# Patient Record
Sex: MALE | Race: WHITE | NOT HISPANIC OR LATINO | Employment: OTHER | URBAN - METROPOLITAN AREA
[De-identification: names, ages, dates, MRNs, and addresses within clinical notes are randomized per-mention and may not be internally consistent; named-entity substitution may affect disease eponyms.]

---

## 2023-10-10 ENCOUNTER — ANCILLARY PROCEDURE (OUTPATIENT)
Dept: RADIOLOGY | Facility: CLINIC | Age: 73
End: 2023-10-10
Payer: MEDICARE

## 2023-10-10 DIAGNOSIS — M25.571 PAIN IN RIGHT ANKLE AND JOINTS OF RIGHT FOOT: ICD-10-CM

## 2023-10-10 PROCEDURE — 73630 X-RAY EXAM OF FOOT: CPT | Mod: RIGHT SIDE | Performed by: RADIOLOGY

## 2023-10-10 PROCEDURE — 73630 X-RAY EXAM OF FOOT: CPT | Mod: RT,FY

## 2025-05-26 ENCOUNTER — HOSPITAL ENCOUNTER (OUTPATIENT)
Facility: HOSPITAL | Age: 75
Setting detail: OBSERVATION
Discharge: HOME | End: 2025-05-27
Attending: INTERNAL MEDICINE | Admitting: INTERNAL MEDICINE
Payer: MEDICARE

## 2025-05-26 DIAGNOSIS — R42 DIZZINESS: Primary | ICD-10-CM

## 2025-05-26 DIAGNOSIS — R42 VERTIGO: ICD-10-CM

## 2025-05-26 PROBLEM — G47.00 PERSISTENT INSOMNIA: Status: ACTIVE | Noted: 2021-01-20

## 2025-05-26 PROBLEM — E78.2 MIXED HYPERLIPIDEMIA: Status: ACTIVE | Noted: 2019-01-08

## 2025-05-26 PROBLEM — E78.00 HYPERCHOLESTEREMIA: Status: ACTIVE | Noted: 2025-05-26

## 2025-05-26 PROBLEM — Z86.718 HISTORY OF DEEP VENOUS THROMBOSIS: Status: ACTIVE | Noted: 2023-08-11

## 2025-05-26 PROBLEM — F13.20 SEDATIVE, HYPNOTIC OR ANXIOLYTIC DEPENDENCE, UNCOMPLICATED (MULTI): Status: ACTIVE | Noted: 2021-01-21

## 2025-05-26 PROBLEM — N40.0 BENIGN PROSTATIC HYPERPLASIA WITHOUT LOWER URINARY TRACT SYMPTOMS: Status: ACTIVE | Noted: 2023-08-11

## 2025-05-26 PROBLEM — D68.59 THROMBOPHILIA (MULTI): Status: ACTIVE | Noted: 2024-08-02

## 2025-05-26 PROBLEM — M50.30 DEGENERATION OF INTERVERTEBRAL DISC OF CERVICAL REGION: Status: ACTIVE | Noted: 2018-04-04

## 2025-05-26 PROBLEM — R73.03 PREDIABETES: Status: ACTIVE | Noted: 2021-01-21

## 2025-05-26 PROBLEM — M75.81 ROTATOR CUFF TENDONITIS, RIGHT: Status: ACTIVE | Noted: 2022-08-19

## 2025-05-26 PROBLEM — Z96.659 HISTORY OF TOTAL KNEE REPLACEMENT: Status: ACTIVE | Noted: 2023-07-26

## 2025-05-26 LAB
GLUCOSE BLD MANUAL STRIP-MCNC: 111 MG/DL (ref 74–99)
GLUCOSE BLD MANUAL STRIP-MCNC: 91 MG/DL (ref 74–99)

## 2025-05-26 PROCEDURE — 96372 THER/PROPH/DIAG INJ SC/IM: CPT

## 2025-05-26 PROCEDURE — 2500000002 HC RX 250 W HCPCS SELF ADMINISTERED DRUGS (ALT 637 FOR MEDICARE OP, ALT 636 FOR OP/ED)

## 2025-05-26 PROCEDURE — 2500000004 HC RX 250 GENERAL PHARMACY W/ HCPCS (ALT 636 FOR OP/ED): Mod: JZ

## 2025-05-26 PROCEDURE — 82947 ASSAY GLUCOSE BLOOD QUANT: CPT

## 2025-05-26 PROCEDURE — 2500000001 HC RX 250 WO HCPCS SELF ADMINISTERED DRUGS (ALT 637 FOR MEDICARE OP)

## 2025-05-26 PROCEDURE — 99223 1ST HOSP IP/OBS HIGH 75: CPT

## 2025-05-26 PROCEDURE — G0378 HOSPITAL OBSERVATION PER HR: HCPCS

## 2025-05-26 RX ORDER — TESTOSTERONE GEL, 1% 10 MG/G
20.25 GEL TRANSDERMAL DAILY
COMMUNITY

## 2025-05-26 RX ORDER — ATORVASTATIN CALCIUM 10 MG/1
5 TABLET, FILM COATED ORAL NIGHTLY
COMMUNITY

## 2025-05-26 RX ORDER — OMEGA-3-ACID ETHYL ESTERS 1 G/1
1 CAPSULE, LIQUID FILLED ORAL 2 TIMES DAILY
COMMUNITY

## 2025-05-26 RX ORDER — ONDANSETRON HYDROCHLORIDE 2 MG/ML
4 INJECTION, SOLUTION INTRAVENOUS EVERY 6 HOURS PRN
Status: DISCONTINUED | OUTPATIENT
Start: 2025-05-26 | End: 2025-05-27 | Stop reason: HOSPADM

## 2025-05-26 RX ORDER — ENOXAPARIN SODIUM 100 MG/ML
40 INJECTION SUBCUTANEOUS EVERY 24 HOURS
Status: DISCONTINUED | OUTPATIENT
Start: 2025-05-26 | End: 2025-05-27 | Stop reason: HOSPADM

## 2025-05-26 RX ORDER — TAMSULOSIN HYDROCHLORIDE 0.4 MG/1
0.4 CAPSULE ORAL DAILY
Status: DISCONTINUED | OUTPATIENT
Start: 2025-05-26 | End: 2025-05-27 | Stop reason: HOSPADM

## 2025-05-26 RX ORDER — CALCIUM CARBONATE/VITAMIN D3 250-3.125
1 TABLET ORAL
COMMUNITY

## 2025-05-26 RX ORDER — OMEPRAZOLE 40 MG/1
40 CAPSULE, DELAYED RELEASE ORAL
Status: ON HOLD | COMMUNITY
End: 2025-05-27 | Stop reason: ENTERED-IN-ERROR

## 2025-05-26 RX ORDER — IBUPROFEN 100 MG/5ML
1000 SUSPENSION, ORAL (FINAL DOSE FORM) ORAL DAILY
COMMUNITY

## 2025-05-26 RX ORDER — PANTOPRAZOLE SODIUM 40 MG/10ML
40 INJECTION, POWDER, LYOPHILIZED, FOR SOLUTION INTRAVENOUS
Status: DISCONTINUED | OUTPATIENT
Start: 2025-05-27 | End: 2025-05-27 | Stop reason: HOSPADM

## 2025-05-26 RX ORDER — BISMUTH SUBSALICYLATE 262 MG
1 TABLET,CHEWABLE ORAL DAILY
COMMUNITY

## 2025-05-26 RX ORDER — ZOLPIDEM TARTRATE 5 MG/1
5 TABLET ORAL NIGHTLY
Status: DISCONTINUED | OUTPATIENT
Start: 2025-05-26 | End: 2025-05-27 | Stop reason: HOSPADM

## 2025-05-26 RX ORDER — METFORMIN HYDROCHLORIDE 1000 MG/1
1000 TABLET ORAL
COMMUNITY

## 2025-05-26 RX ORDER — VALACYCLOVIR HYDROCHLORIDE 1 G/1
1000 TABLET, FILM COATED ORAL 2 TIMES DAILY PRN
COMMUNITY

## 2025-05-26 RX ORDER — ACETAMINOPHEN 325 MG/1
650 TABLET ORAL EVERY 4 HOURS PRN
Status: DISCONTINUED | OUTPATIENT
Start: 2025-05-26 | End: 2025-05-27 | Stop reason: HOSPADM

## 2025-05-26 RX ORDER — GUAIFENESIN 600 MG/1
600 TABLET, EXTENDED RELEASE ORAL EVERY 12 HOURS PRN
Status: DISCONTINUED | OUTPATIENT
Start: 2025-05-26 | End: 2025-05-27 | Stop reason: HOSPADM

## 2025-05-26 RX ORDER — ICOSAPENT ETHYL 1 G/1
1 CAPSULE ORAL
Status: DISCONTINUED | OUTPATIENT
Start: 2025-05-26 | End: 2025-05-27 | Stop reason: HOSPADM

## 2025-05-26 RX ORDER — TAMSULOSIN HYDROCHLORIDE 0.4 MG/1
0.4 CAPSULE ORAL NIGHTLY
COMMUNITY
End: 2025-05-27 | Stop reason: HOSPADM

## 2025-05-26 RX ORDER — SUMATRIPTAN SUCCINATE 100 MG/1
100 TABLET ORAL ONCE AS NEEDED
COMMUNITY

## 2025-05-26 RX ORDER — PANTOPRAZOLE SODIUM 40 MG/1
40 TABLET, DELAYED RELEASE ORAL
Status: DISCONTINUED | OUTPATIENT
Start: 2025-05-27 | End: 2025-05-27 | Stop reason: HOSPADM

## 2025-05-26 RX ORDER — ATORVASTATIN CALCIUM 10 MG/1
5 TABLET, FILM COATED ORAL NIGHTLY
Status: DISCONTINUED | OUTPATIENT
Start: 2025-05-26 | End: 2025-05-27 | Stop reason: HOSPADM

## 2025-05-26 RX ORDER — TALC
3 POWDER (GRAM) TOPICAL NIGHTLY PRN
Status: DISCONTINUED | OUTPATIENT
Start: 2025-05-26 | End: 2025-05-27 | Stop reason: HOSPADM

## 2025-05-26 RX ORDER — MELOXICAM 15 MG/1
15 TABLET ORAL DAILY
COMMUNITY

## 2025-05-26 RX ORDER — POLYETHYLENE GLYCOL 3350 17 G/17G
17 POWDER, FOR SOLUTION ORAL DAILY PRN
Status: DISCONTINUED | OUTPATIENT
Start: 2025-05-26 | End: 2025-05-27 | Stop reason: HOSPADM

## 2025-05-26 RX ORDER — INSULIN LISPRO 100 [IU]/ML
0-10 INJECTION, SOLUTION INTRAVENOUS; SUBCUTANEOUS
Status: DISCONTINUED | OUTPATIENT
Start: 2025-05-26 | End: 2025-05-27 | Stop reason: HOSPADM

## 2025-05-26 RX ORDER — AMOXICILLIN 500 MG/1
500 CAPSULE ORAL
Status: ON HOLD | COMMUNITY
End: 2025-05-26 | Stop reason: WASHOUT

## 2025-05-26 RX ORDER — ZOLPIDEM TARTRATE 6.25 MG/1
12.5 TABLET, FILM COATED, EXTENDED RELEASE ORAL NIGHTLY PRN
COMMUNITY

## 2025-05-26 RX ADMIN — ENOXAPARIN SODIUM 40 MG: 40 INJECTION SUBCUTANEOUS at 17:40

## 2025-05-26 RX ADMIN — ATORVASTATIN CALCIUM 5 MG: 10 TABLET, FILM COATED ORAL at 21:36

## 2025-05-26 RX ADMIN — ZOLPIDEM TARTRATE 5 MG: 5 TABLET, FILM COATED ORAL at 21:10

## 2025-05-26 SDOH — ECONOMIC STABILITY: INCOME INSECURITY: IN THE PAST 12 MONTHS HAS THE ELECTRIC, GAS, OIL, OR WATER COMPANY THREATENED TO SHUT OFF SERVICES IN YOUR HOME?: NO

## 2025-05-26 SDOH — ECONOMIC STABILITY: HOUSING INSECURITY: IN THE LAST 12 MONTHS, WAS THERE A TIME WHEN YOU WERE NOT ABLE TO PAY THE MORTGAGE OR RENT ON TIME?: NO

## 2025-05-26 SDOH — ECONOMIC STABILITY: FOOD INSECURITY: WITHIN THE PAST 12 MONTHS, YOU WORRIED THAT YOUR FOOD WOULD RUN OUT BEFORE YOU GOT THE MONEY TO BUY MORE.: NEVER TRUE

## 2025-05-26 SDOH — SOCIAL STABILITY: SOCIAL INSECURITY: ARE THERE ANY APPARENT SIGNS OF INJURIES/BEHAVIORS THAT COULD BE RELATED TO ABUSE/NEGLECT?: NO

## 2025-05-26 SDOH — SOCIAL STABILITY: SOCIAL INSECURITY: DO YOU FEEL ANYONE HAS EXPLOITED OR TAKEN ADVANTAGE OF YOU FINANCIALLY OR OF YOUR PERSONAL PROPERTY?: NO

## 2025-05-26 SDOH — ECONOMIC STABILITY: HOUSING INSECURITY: AT ANY TIME IN THE PAST 12 MONTHS, WERE YOU HOMELESS OR LIVING IN A SHELTER (INCLUDING NOW)?: NO

## 2025-05-26 SDOH — SOCIAL STABILITY: SOCIAL INSECURITY
WITHIN THE LAST YEAR, HAVE YOU BEEN RAPED OR FORCED TO HAVE ANY KIND OF SEXUAL ACTIVITY BY YOUR PARTNER OR EX-PARTNER?: NO

## 2025-05-26 SDOH — ECONOMIC STABILITY: FOOD INSECURITY: WITHIN THE PAST 12 MONTHS, THE FOOD YOU BOUGHT JUST DIDN'T LAST AND YOU DIDN'T HAVE MONEY TO GET MORE.: NEVER TRUE

## 2025-05-26 SDOH — SOCIAL STABILITY: SOCIAL INSECURITY: ARE YOU OR HAVE YOU BEEN THREATENED OR ABUSED PHYSICALLY, EMOTIONALLY, OR SEXUALLY BY ANYONE?: NO

## 2025-05-26 SDOH — SOCIAL STABILITY: SOCIAL INSECURITY: WITHIN THE LAST YEAR, HAVE YOU BEEN AFRAID OF YOUR PARTNER OR EX-PARTNER?: NO

## 2025-05-26 SDOH — ECONOMIC STABILITY: TRANSPORTATION INSECURITY: IN THE PAST 12 MONTHS, HAS LACK OF TRANSPORTATION KEPT YOU FROM MEDICAL APPOINTMENTS OR FROM GETTING MEDICATIONS?: NO

## 2025-05-26 SDOH — SOCIAL STABILITY: SOCIAL INSECURITY
WITHIN THE LAST YEAR, HAVE YOU BEEN KICKED, HIT, SLAPPED, OR OTHERWISE PHYSICALLY HURT BY YOUR PARTNER OR EX-PARTNER?: NO

## 2025-05-26 SDOH — SOCIAL STABILITY: SOCIAL INSECURITY: WITHIN THE LAST YEAR, HAVE YOU BEEN HUMILIATED OR EMOTIONALLY ABUSED IN OTHER WAYS BY YOUR PARTNER OR EX-PARTNER?: NO

## 2025-05-26 SDOH — SOCIAL STABILITY: SOCIAL INSECURITY: DOES ANYONE TRY TO KEEP YOU FROM HAVING/CONTACTING OTHER FRIENDS OR DOING THINGS OUTSIDE YOUR HOME?: NO

## 2025-05-26 SDOH — SOCIAL STABILITY: SOCIAL INSECURITY: WERE YOU ABLE TO COMPLETE ALL THE BEHAVIORAL HEALTH SCREENINGS?: YES

## 2025-05-26 SDOH — SOCIAL STABILITY: SOCIAL INSECURITY: HAVE YOU HAD THOUGHTS OF HARMING ANYONE ELSE?: NO

## 2025-05-26 SDOH — ECONOMIC STABILITY: HOUSING INSECURITY: IN THE PAST 12 MONTHS, HOW MANY TIMES HAVE YOU MOVED WHERE YOU WERE LIVING?: 0

## 2025-05-26 SDOH — ECONOMIC STABILITY: FOOD INSECURITY: HOW HARD IS IT FOR YOU TO PAY FOR THE VERY BASICS LIKE FOOD, HOUSING, MEDICAL CARE, AND HEATING?: NOT HARD AT ALL

## 2025-05-26 SDOH — SOCIAL STABILITY: SOCIAL INSECURITY: HAS ANYONE EVER THREATENED TO HURT YOUR FAMILY OR YOUR PETS?: NO

## 2025-05-26 SDOH — SOCIAL STABILITY: SOCIAL INSECURITY: DO YOU FEEL UNSAFE GOING BACK TO THE PLACE WHERE YOU ARE LIVING?: NO

## 2025-05-26 SDOH — SOCIAL STABILITY: SOCIAL INSECURITY: HAVE YOU HAD ANY THOUGHTS OF HARMING ANYONE ELSE?: NO

## 2025-05-26 SDOH — SOCIAL STABILITY: SOCIAL INSECURITY: ABUSE: ADULT

## 2025-05-26 ASSESSMENT — COGNITIVE AND FUNCTIONAL STATUS - GENERAL
PATIENT BASELINE BEDBOUND: NO
CLIMB 3 TO 5 STEPS WITH RAILING: A LITTLE
MOBILITY SCORE: 21
DAILY ACTIVITIY SCORE: 24
MOVING TO AND FROM BED TO CHAIR: A LITTLE
WALKING IN HOSPITAL ROOM: A LITTLE

## 2025-05-26 ASSESSMENT — ACTIVITIES OF DAILY LIVING (ADL)
HEARING - RIGHT EAR: HEARING AID
FEEDING YOURSELF: INDEPENDENT
PATIENT'S MEMORY ADEQUATE TO SAFELY COMPLETE DAILY ACTIVITIES?: YES
WALKS IN HOME: INDEPENDENT
LACK_OF_TRANSPORTATION: NO
GROOMING: INDEPENDENT
BATHING: INDEPENDENT
TOILETING: INDEPENDENT
HEARING - LEFT EAR: HEARING AID
DRESSING YOURSELF: INDEPENDENT
ADEQUATE_TO_COMPLETE_ADL: YES
JUDGMENT_ADEQUATE_SAFELY_COMPLETE_DAILY_ACTIVITIES: YES
LACK_OF_TRANSPORTATION: NO

## 2025-05-26 ASSESSMENT — COLUMBIA-SUICIDE SEVERITY RATING SCALE - C-SSRS
6. HAVE YOU EVER DONE ANYTHING, STARTED TO DO ANYTHING, OR PREPARED TO DO ANYTHING TO END YOUR LIFE?: NO
2. HAVE YOU ACTUALLY HAD ANY THOUGHTS OF KILLING YOURSELF?: NO
1. IN THE PAST MONTH, HAVE YOU WISHED YOU WERE DEAD OR WISHED YOU COULD GO TO SLEEP AND NOT WAKE UP?: NO

## 2025-05-26 ASSESSMENT — LIFESTYLE VARIABLES
SKIP TO QUESTIONS 9-10: 1
AUDIT-C TOTAL SCORE: 0
HOW OFTEN DO YOU HAVE 6 OR MORE DRINKS ON ONE OCCASION: NEVER
HOW MANY STANDARD DRINKS CONTAINING ALCOHOL DO YOU HAVE ON A TYPICAL DAY: PATIENT DOES NOT DRINK
AUDIT-C TOTAL SCORE: 0
HOW OFTEN DO YOU HAVE A DRINK CONTAINING ALCOHOL: NEVER

## 2025-05-26 ASSESSMENT — ENCOUNTER SYMPTOMS
CHEST TIGHTNESS: 0
WHEEZING: 0
FEVER: 0
TREMORS: 0
NAUSEA: 0
COUGH: 0
FATIGUE: 0
BACK PAIN: 0
HEADACHES: 0
JOINT SWELLING: 0
DIZZINESS: 1
PALPITATIONS: 0
FLANK PAIN: 0
HEMATURIA: 0
SHORTNESS OF BREATH: 0
CHILLS: 0
ACTIVITY CHANGE: 1
APPETITE CHANGE: 1
CONSTIPATION: 0
VOMITING: 0
DIARRHEA: 0
WEAKNESS: 0
ABDOMINAL PAIN: 0
WOUND: 0

## 2025-05-26 ASSESSMENT — PAIN - FUNCTIONAL ASSESSMENT
PAIN_FUNCTIONAL_ASSESSMENT: 0-10
PAIN_FUNCTIONAL_ASSESSMENT: 0-10

## 2025-05-26 ASSESSMENT — PATIENT HEALTH QUESTIONNAIRE - PHQ9
2. FEELING DOWN, DEPRESSED OR HOPELESS: NOT AT ALL
SUM OF ALL RESPONSES TO PHQ9 QUESTIONS 1 & 2: 0
1. LITTLE INTEREST OR PLEASURE IN DOING THINGS: NOT AT ALL

## 2025-05-26 ASSESSMENT — PAIN SCALES - GENERAL
PAINLEVEL_OUTOF10: 0 - NO PAIN
PAINLEVEL_OUTOF10: 0 - NO PAIN

## 2025-05-26 NOTE — CARE PLAN
Problem: Pain - Adult  Goal: Verbalizes/displays adequate comfort level or baseline comfort level  Outcome: Progressing  Flowsheets (Taken 5/26/2025 3736)  Verbalizes/displays adequate comfort level or baseline comfort level: Encourage patient to monitor pain and request assistance     Problem: Fall/Injury  Goal: Not fall by end of shift  Outcome: Progressing  Goal: Be free from injury by end of the shift  Outcome: Progressing   The patient's goals for the shift include  to have less dizziness during shift    The clinical goals for the shift include Pt will remain safe and not fall during shift

## 2025-05-26 NOTE — H&P
[facility] - GENERAL MEDICINE HISTORY AND PHYSICAL    HISTORY OF PRESENT ILLNESS     History Obtained From (Primary Source): patient  Collateral History (Secondary Sources): D/w ED    History Of Present Illness (HPI):  Toby Smith is a 75 y.o. male with PMHx s/f HLD, history of DVT, T2DM, BPH, GERD presenting with dizziness. He states the dizziness started on Wednesday, had half a day of dizziness/nausea then went away. Then, he was back to normal until 4am Sunday. He woke up from dizziness with his room spinning sensation, he couldn't focus on his alarm clock and lamp as it looked like a motion blur. He has a hard time ambulating, but is able to stand up. Dizziness is exacerbated by quick movements of his head. Has nausea/vomiting prior to [place] ED and wasn't able to tolerate food for about 12 hrs. He has a history of migraines, used to take sumatriptan but hasn't had it for a couple years. He was transferred from [facility] ED as patient is having trouble ambulating and for further workup. Denies vision changes, facial droop, speech changes, hearing changes, focal or lateralizing weakness.     ED Course:   Vitals on presentation: T 36.4 °C (97.6 °F)  HR 72  /81  RR 17  O2 93 %    Labs:   BMP, CBC largely unremarkable  Imaging - agree with radiology interpretation(s):   CT head-no acute intracranial abnormality.  Mild chronic brain parenchymal changes.  Interventions: Normal saline 1 L, Zofran 4 mg, meclizine 25 mg, Valium 5 mg, admission for further management    12-point ROS reviewed and found to be negative aside from aforementioned positives in HPI and/or noted in dedicated ROS section below.     Decision made to admit the patient to the hospitalist service after evaluation of the patient, review of the above, and discussion with ED provider.     LABS AND IMAGING     I have personally reviewed the following labs from 05/26/25: CBC and BMP  I have personally reviewed the  following imaging studies from 05/26/25: CT Head without Contrast , with my personal interpretations as documented in ED course above.   I have personally reviewed any obtained EKGs on 05/26/25, with my interpretation as listed above in the ED summary course.   I have personally reviewed the patient's vitals on presentation to the ED and any/all changes through to time of admission (on 05/26/25).     ED Course (From ED Provider):     Relevant Results  No results found for this or any previous visit (from the past 24 hours).   Imaging  CT head wo IV contrast  Result Date: 5/25/2025  IMPRESSION: Brain CT shows no evidence of an acute intracranial abnormality. Mild chronic brain parenchymal changes and other details as above. : PSCB   Transcribe Date/Time: May 25 2025 10:14P Dictated by : AMIRA MONTERO MD This examination was interpreted and the report reviewed and electronically signed by: AMIRA MONTERO MD on May 25 2025 10:20PM  EST      Cardiology, Vascular, and Other Imaging  CT head wo IV contrast  Result Date: 5/25/2025  * * *Final Report* * * DATE OF EXAM: May 25 2025 10:07PM   [facility]   0504  -  CT BRAIN WO IVCON   / ACCESSION #  [id number] PROCEDURE REASON: vertigo      * * * * Physician Interpretation * * * *  EXAMINATION:  CT BRAIN WO IVCON CLINICAL HISTORY:  vertigo.  vertigo. . TECHNIQUE: Routine CT of the brain without IV contrast. CT Dose-Length Product (DLP): 906 mGy*cm CT Dose Reduction Employed: Automated exposure control(AEC) and iterative recon; COMPARISON: None available. RESULT: Post-operative change: None. Acute change:  No evidence of a sizable/large acute territorial brain infarct/parenchymal edema.  MRI may be considered as a more sensitive modality if continued clinical concern/warranted. Hemorrhage: No evidence of acute intracranial hemorrhage. Mass Lesion / Mass Effect: There is no evidence of a sizable brain mass.   No significant mass effect or extra-axial fluid  collection. Chronic (or likely chronic) changes, including brain parenchymal: Intracranial arterial wall calcifications.  Tortuous vertebrobasilar arteries with mild brainstem compressions.  Dural calcifications noted.  Punctate hypodense supratentorial white matter foci are nonspecific, but most commonly on the basis of minimal microvascular ischemia. There is mild (mostly bifrontal) cerebral volume loss.  The brain parenchyma is otherwise unremarkable for age (in this modality). Ventricles: Commensurate with sulcal sizes.  No hydrocephalus. Visualized paranasal sinuses: Partially imaged.   Few areas of minimal mucosal thickening. Other:   No depressed skull fracture is seen.  Grossly clear mastoid air cells.  The skull base shows some osteopenia, which may limit assessment for any subtle lesions.  (topogram) images: Degenerative spine changes noted.   Non-diagnostic otherwise.    IMPRESSION: Brain CT shows no evidence of an acute intracranial abnormality. Mild chronic brain parenchymal changes and other details as above. : CHON   Transcribe Date/Time: May 25 2025 10:14P Dictated by : AMIRA MONTERO MD This examination was interpreted and the report reviewed and electronically signed by: AMIRA MONTERO MD on May 25 2025 10:20PM  EST         PAST HISTORIES AND ALLERGIES     Past Medical History  He has no past medical history on file.    Surgical History  He has no past surgical history on file.     Social History  He reports that he has never smoked. He has never been exposed to tobacco smoke. He has never used smokeless tobacco. No history on file for alcohol use and drug use.    Family History  Family History[1]    Allergies  Ciprofloxacin    MEDICATIONS     Scheduled Medications:  Scheduled Medications[2]  Continuous Medications:  Continuous Medications[3]  PRN Medications:  PRN Medications[4]     REVIEW OF SYSTEMS     Review of Systems   Constitutional:  Positive for activity change and  appetite change. Negative for chills, fatigue and fever.   Respiratory:  Negative for cough, chest tightness, shortness of breath and wheezing.    Cardiovascular:  Negative for chest pain, palpitations and leg swelling.   Gastrointestinal:  Negative for abdominal pain, constipation, diarrhea, nausea and vomiting.   Genitourinary:  Negative for flank pain and hematuria.   Musculoskeletal:  Negative for back pain and joint swelling.   Skin:  Negative for rash and wound.   Neurological:  Positive for dizziness. Negative for tremors, syncope, weakness and headaches.       OBJECTIVE     Last Recorded Vitals  /81 (BP Location: Left arm, Patient Position: Lying)   Pulse 72   Temp 36.4 °C (97.6 °F) (Temporal)   Resp 17   Wt 80.9 kg (178 lb 4.8 oz)   SpO2 93%      Physical Exam:  Vital signs and nursing notes reviewed.   Constitutional: Pleasant and cooperative. Laying in bed in no acute distress. Conversant.   Skin: Warm and dry; no obvious lesions, rashes, pallor, or jaundice.   Eyes: EOMI. Anicteric sclera.   ENT: Mucous membranes moist; no obvious injury or deformity appreciated.   Head and Neck: Normocephalic, atraumatic. ROM preserved. Trachea midline. No appreciable JVD.   Respiratory: Nonlabored on RA. Lungs clear to auscultation bilaterally without obvious adventitious sounds. Chest rise is equal.  Cardiovascular: RRR. No gross murmur, gallop, or rub. Extremities are warm and well-perfused with good capillary refill (< 3 seconds). No chest wall tenderness.   GI: Abdomen soft, nontender, nondistended. No obvious organomegaly appreciated. Bowel sounds are present.  : No CVA tenderness.   MSK: No gross abnormalities appreciated. No limitations to AROM/PROM appreciated.   Extremities: No cyanosis, edema, or clubbing evident. Neurovascularly intact.   Neuro: A&Ox3. CN 2-12 grossly intact. Able to respond to questions appropriately and clearly. No acute focal neurologic deficits appreciated. No nystagmus with  [place]ec[place]-Hallpike maneuver.  Psych: Appropriate mood and behavior.    ASSESSMENT AND PLAN   Assessment/Plan     75 y.o. male with PMHx s/f HLD, history of DVT, T2DM, BPH, GERD presenting with dizziness.     Dizziness  Vertiginous-like symptoms  -pt states he's feeling better at this Vin[place]-jo pike maneuver compared to yesterday night at ED, no nystagmus noted on exam. Is able to tolerate food now.  -continue with trial of meclizine, antiemetics PRN  -MRI WO to r/o posterior stroke  -PT/OT eval    HLD  -continue home vascepa    T2DM  -Hold home oral meds for now   -Continue with SSI ACHS, Accucheks, hypoglycemic protocol     BPH  -continue home med    Diet: Cardiac  DVT Prophylaxis: SCDs, Lovenox   Code Status: Full Code   Case Discussed With: ED provider  Additional Sources Reviewed: ED note day of admission    Anticipated Length of Stay (LOS): Patient will require one-to-two midnight stay for further evaluation and management, pending results of above and/or input from consultants.      Dali Hernández PA-C    Dragon dictation software was used to dictate this note and thus there may be minor errors in translation/transcription including garbled speech or misspellings. Please contact for clarification if needed.         [1] No family history on file.  [2] enoxaparin, 40 mg, subcutaneous, q24h  icosapent ethyL, 1 g, oral, BID  insulin lispro, 0-10 Units, subcutaneous, Before meals & nightly  [START ON 5/27/2025] pantoprazole, 40 mg, oral, Daily before breakfast   Or  [START ON 5/27/2025] pantoprazole, 40 mg, intravenous, Daily before breakfast  [START ON 5/27/2025] pneumococcal polysaccharide, 0.5 mL, intramuscular, During hospitalization  tamsulosin, 0.4 mg, oral, Daily  zolpidem, 5 mg, oral, Nightly    [3]    [4] PRN medications: acetaminophen, guaiFENesin, melatonin, ondansetron, polyethylene glycol

## 2025-05-26 NOTE — NURSING NOTE
Pt was a direct admit from [place]. Pt is alert and oriented in bed in the low locked position with call light in reach. Plan for patient is to have a MRI, MRI  screening sheet complete.

## 2025-05-27 ENCOUNTER — APPOINTMENT (OUTPATIENT)
Dept: RADIOLOGY | Facility: HOSPITAL | Age: 75
End: 2025-05-27
Payer: MEDICARE

## 2025-05-27 ENCOUNTER — PHARMACY VISIT (OUTPATIENT)
Dept: PHARMACY | Facility: CLINIC | Age: 75
End: 2025-05-27
Payer: COMMERCIAL

## 2025-05-27 VITALS
TEMPERATURE: 97.6 F | HEART RATE: 109 BPM | HEIGHT: 67 IN | DIASTOLIC BLOOD PRESSURE: 84 MMHG | RESPIRATION RATE: 19 BRPM | BODY MASS INDEX: 27.99 KG/M2 | SYSTOLIC BLOOD PRESSURE: 130 MMHG | WEIGHT: 178.3 LBS | OXYGEN SATURATION: 94 %

## 2025-05-27 PROBLEM — R42 DIZZINESS: Status: ACTIVE | Noted: 2025-05-27

## 2025-05-27 LAB
ALBUMIN SERPL BCP-MCNC: 3.7 G/DL (ref 3.4–5)
ALP SERPL-CCNC: 71 U/L (ref 33–136)
ALT SERPL W P-5'-P-CCNC: 20 U/L (ref 10–52)
ANION GAP SERPL CALC-SCNC: 8 MMOL/L (ref 10–20)
AST SERPL W P-5'-P-CCNC: 49 U/L (ref 9–39)
BILIRUB SERPL-MCNC: 0.6 MG/DL (ref 0–1.2)
BUN SERPL-MCNC: 22 MG/DL (ref 6–23)
CALCIUM SERPL-MCNC: 8.6 MG/DL (ref 8.6–10.3)
CHLORIDE SERPL-SCNC: 107 MMOL/L (ref 98–107)
CO2 SERPL-SCNC: 28 MMOL/L (ref 21–32)
CREAT SERPL-MCNC: 1.05 MG/DL (ref 0.5–1.3)
EGFRCR SERPLBLD CKD-EPI 2021: 74 ML/MIN/1.73M*2
ERYTHROCYTE [DISTWIDTH] IN BLOOD BY AUTOMATED COUNT: 12.2 % (ref 11.5–14.5)
GLUCOSE SERPL-MCNC: 97 MG/DL (ref 74–99)
HCT VFR BLD AUTO: 46.2 % (ref 41–52)
HGB BLD-MCNC: 15.1 G/DL (ref 13.5–17.5)
MCH RBC QN AUTO: 31.9 PG (ref 26–34)
MCHC RBC AUTO-ENTMCNC: 32.7 G/DL (ref 32–36)
MCV RBC AUTO: 98 FL (ref 80–100)
NRBC BLD-RTO: 0 /100 WBCS (ref 0–0)
PLATELET # BLD AUTO: 192 X10*3/UL (ref 150–450)
POTASSIUM SERPL-SCNC: 4.2 MMOL/L (ref 3.5–5.3)
PROT SERPL-MCNC: 6 G/DL (ref 6.4–8.2)
RBC # BLD AUTO: 4.74 X10*6/UL (ref 4.5–5.9)
SODIUM SERPL-SCNC: 139 MMOL/L (ref 136–145)
WBC # BLD AUTO: 5.3 X10*3/UL (ref 4.4–11.3)

## 2025-05-27 PROCEDURE — G0378 HOSPITAL OBSERVATION PER HR: HCPCS

## 2025-05-27 PROCEDURE — 97162 PT EVAL MOD COMPLEX 30 MIN: CPT | Mod: GP

## 2025-05-27 PROCEDURE — 2500000004 HC RX 250 GENERAL PHARMACY W/ HCPCS (ALT 636 FOR OP/ED): Mod: JZ

## 2025-05-27 PROCEDURE — 85027 COMPLETE CBC AUTOMATED: CPT

## 2025-05-27 PROCEDURE — 97165 OT EVAL LOW COMPLEX 30 MIN: CPT | Mod: GO

## 2025-05-27 PROCEDURE — 70551 MRI BRAIN STEM W/O DYE: CPT | Performed by: RADIOLOGY

## 2025-05-27 PROCEDURE — 80053 COMPREHEN METABOLIC PANEL: CPT

## 2025-05-27 PROCEDURE — 1200000002 HC GENERAL ROOM WITH TELEMETRY DAILY

## 2025-05-27 PROCEDURE — 2500000002 HC RX 250 W HCPCS SELF ADMINISTERED DRUGS (ALT 637 FOR MEDICARE OP, ALT 636 FOR OP/ED): Performed by: PHYSICIAN ASSISTANT

## 2025-05-27 PROCEDURE — RXMED WILLOW AMBULATORY MEDICATION CHARGE

## 2025-05-27 PROCEDURE — 2500000001 HC RX 250 WO HCPCS SELF ADMINISTERED DRUGS (ALT 637 FOR MEDICARE OP)

## 2025-05-27 PROCEDURE — 36415 COLL VENOUS BLD VENIPUNCTURE: CPT

## 2025-05-27 PROCEDURE — 70551 MRI BRAIN STEM W/O DYE: CPT

## 2025-05-27 PROCEDURE — 99233 SBSQ HOSP IP/OBS HIGH 50: CPT | Performed by: PHYSICIAN ASSISTANT

## 2025-05-27 RX ORDER — GARLIC 100 MG
1 TABLET ORAL 2 TIMES DAILY
COMMUNITY

## 2025-05-27 RX ORDER — OMEPRAZOLE 20 MG/1
10 TABLET, DELAYED RELEASE ORAL
COMMUNITY

## 2025-05-27 RX ORDER — VITAMIN B COMPLEX
1 CAPSULE ORAL DAILY
COMMUNITY

## 2025-05-27 RX ORDER — MECLIZINE HYDROCHLORIDE 25 MG/1
25 TABLET ORAL EVERY 8 HOURS
Status: DISCONTINUED | OUTPATIENT
Start: 2025-05-27 | End: 2025-05-27 | Stop reason: HOSPADM

## 2025-05-27 RX ORDER — CHOLECALCIFEROL (VITAMIN D3) 25 MCG
25 TABLET ORAL DAILY
COMMUNITY

## 2025-05-27 RX ORDER — DIAZEPAM 2 MG/1
2 TABLET ORAL EVERY 8 HOURS PRN
Status: DISCONTINUED | OUTPATIENT
Start: 2025-05-27 | End: 2025-05-27 | Stop reason: HOSPADM

## 2025-05-27 RX ORDER — MECLIZINE HYDROCHLORIDE 25 MG/1
25 TABLET ORAL EVERY 8 HOURS
Qty: 15 TABLET | Refills: 0 | Status: SHIPPED | OUTPATIENT
Start: 2025-05-27

## 2025-05-27 RX ADMIN — MECLIZINE HYDROCHLORIDE 25 MG: 25 TABLET ORAL at 17:04

## 2025-05-27 RX ADMIN — ICOSAPENT ETHYL 1 G: 1000 CAPSULE ORAL at 09:12

## 2025-05-27 RX ADMIN — PANTOPRAZOLE SODIUM 40 MG: 40 TABLET, DELAYED RELEASE ORAL at 06:03

## 2025-05-27 RX ADMIN — MECLIZINE HYDROCHLORIDE 25 MG: 25 TABLET ORAL at 10:53

## 2025-05-27 RX ADMIN — ENOXAPARIN SODIUM 40 MG: 40 INJECTION SUBCUTANEOUS at 17:04

## 2025-05-27 RX ADMIN — ICOSAPENT ETHYL 1 G: 1000 CAPSULE ORAL at 17:04

## 2025-05-27 ASSESSMENT — ENCOUNTER SYMPTOMS
VOMITING: 0
BACK PAIN: 0
EYE PAIN: 0
CHILLS: 0
LIGHT-HEADEDNESS: 0
BLOOD IN STOOL: 0
HALLUCINATIONS: 0
COUGH: 0
DYSURIA: 0
ABDOMINAL PAIN: 0
WEAKNESS: 0
HEADACHES: 0
SORE THROAT: 0
DIAPHORESIS: 0
FLANK PAIN: 0
WOUND: 0
WHEEZING: 0
DIARRHEA: 0
TROUBLE SWALLOWING: 0
FREQUENCY: 0
CHEST TIGHTNESS: 0
FACIAL SWELLING: 0
SHORTNESS OF BREATH: 0
DIZZINESS: 1
BRUISES/BLEEDS EASILY: 0
NAUSEA: 0
FEVER: 0
JOINT SWELLING: 0
FATIGUE: 0
CONSTIPATION: 0
PALPITATIONS: 0
APPETITE CHANGE: 0
HEMATURIA: 0
NUMBNESS: 0

## 2025-05-27 ASSESSMENT — ACTIVITIES OF DAILY LIVING (ADL)
BATHING_ASSISTANCE: MINIMAL
ADL_ASSISTANCE: INDEPENDENT

## 2025-05-27 ASSESSMENT — COGNITIVE AND FUNCTIONAL STATUS - GENERAL
DRESSING REGULAR UPPER BODY CLOTHING: A LITTLE
TOILETING: A LITTLE
CLIMB 3 TO 5 STEPS WITH RAILING: A LITTLE
DRESSING REGULAR LOWER BODY CLOTHING: A LITTLE
MOVING TO AND FROM BED TO CHAIR: A LITTLE
WALKING IN HOSPITAL ROOM: A LITTLE
CLIMB 3 TO 5 STEPS WITH RAILING: A LOT
DAILY ACTIVITIY SCORE: 24
MOVING TO AND FROM BED TO CHAIR: A LITTLE
MOBILITY SCORE: 21
MOBILITY SCORE: 18
WALKING IN HOSPITAL ROOM: A LITTLE
HELP NEEDED FOR BATHING: A LOT
TURNING FROM BACK TO SIDE WHILE IN FLAT BAD: A LITTLE
DAILY ACTIVITIY SCORE: 19
STANDING UP FROM CHAIR USING ARMS: A LITTLE

## 2025-05-27 ASSESSMENT — PAIN - FUNCTIONAL ASSESSMENT
PAIN_FUNCTIONAL_ASSESSMENT: 0-10
PAIN_FUNCTIONAL_ASSESSMENT: 0-10

## 2025-05-27 ASSESSMENT — PAIN SCALES - GENERAL
PAINLEVEL_OUTOF10: 0 - NO PAIN

## 2025-05-27 NOTE — NURSING NOTE
Pt still remains dizzy, has not tried to get out of bed to ambulate yet.  He did not sleep very well during the night due to roommate having the tv on and not having the right dose of his ambien that he takes at home. TASH MIXON RN

## 2025-05-27 NOTE — NURSING NOTE
Pt discharge orders received and reviewed with patient and s/o. Pt IV and tele monitor removed. Pt escorted to main entrance via wheelchair by myself and assisted to s/o's awaiting car. Pt and s/o expressed gratitude for care during stay.

## 2025-05-27 NOTE — PROGRESS NOTES
Toby Smith is a 75 y.o. male on day 0 of admission presenting with Vertigo.      Subjective   Toby Smith is a 75 y.o. male with PMHx s/f HLD, history of DVT, T2DM, BPH, GERD presented 5/26/25 with dizziness. He states the dizziness started on Wednesday, had half a day of dizziness/nausea then went away. Then, he was back to normal until 4am Sunday. He woke up from dizziness with his room spinning sensation, he couldn't focus on his alarm clock and lamp as it looked like a motion blur. He has a hard time ambulating, but is able to stand up. Dizziness is exacerbated by quick movements of his head. Has nausea/vomiting prior to [place] ED and wasn't able to tolerate food for about 12 hrs. He has a history of migraines, used to take sumatriptan but hasn't had it for a couple years. He was transferred from [facility] ED as patient is having trouble ambulating and for further workup. Denies vision changes, facial droop, speech changes, hearing changes, focal or lateralizing weakness. BMP, CBC largely unremarkable. CT head-no acute intracranial abnormality.  Mild chronic brain parenchymal changes. Patient states he is feeling better after [place]-jo pike maneuver compared to when he was in the ED, no nystagmus noted on exam. Is able to tolerate food now.    5/27/2025: No acute events overnight. Vitals stable. CBC/BMP reviewed, largely unremarkable.    Trial scheduled meclizine with PRN valium. Stop flomax. Check MRI brain   Patient reports overall he feels significantly better. Still with dizziness, more-so with movements of the head but does improve with lying still, reports it does not completely resolve.   +Horizontal nystagmus          Review of Systems   Constitutional:  Negative for appetite change, chills, diaphoresis, fatigue and fever.   HENT:  Negative for congestion, ear pain, facial swelling, hearing loss, nosebleeds, sore throat, tinnitus and trouble swallowing.    Eyes:  Negative for pain.    Respiratory:  Negative for cough, chest tightness, shortness of breath and wheezing.    Cardiovascular:  Negative for chest pain, palpitations and leg swelling.   Gastrointestinal:  Negative for abdominal pain, blood in stool, constipation, diarrhea, nausea and vomiting.   Genitourinary:  Negative for dysuria, flank pain, frequency, hematuria and urgency.   Musculoskeletal:  Negative for back pain and joint swelling.   Skin:  Negative for rash and wound.   Neurological:  Positive for dizziness. Negative for syncope, weakness, light-headedness, numbness and headaches.   Hematological:  Does not bruise/bleed easily.   Psychiatric/Behavioral:  Negative for behavioral problems, hallucinations and suicidal ideas.           Objective     Last Recorded Vitals  /71 (BP Location: Left arm, Patient Position: Lying)   Pulse 62   Temp 36.7 °C (98 °F) (Temporal)   Resp 16   Wt 80.9 kg (178 lb 4.8 oz)   SpO2 95%     Image Results  Imaging  CT head wo IV contrast  Result Date: 5/25/2025  IMPRESSION: Brain CT shows no evidence of an acute intracranial abnormality. Mild chronic brain parenchymal changes and other details as above. : CHON   Transcribe Date/Time: May 25 2025 10:14P Dictated by : AMIRA MONTERO MD This examination was interpreted and the report reviewed and electronically signed by: AMIRA MONTERO MD on May 25 2025 10:20PM  EST      Cardiology, Vascular, and Other Imaging  CT head wo IV contrast  Result Date: 5/25/2025  * * *Final Report* * * DATE OF EXAM: May 25 2025 10:07PM   [facility]   0504  -  CT BRAIN WO IVCON   / ACCESSION #  [id number] PROCEDURE REASON: vertigo      * * * * Physician Interpretation * * * *  EXAMINATION:  CT BRAIN WO IVCON CLINICAL HISTORY:  vertigo.  vertigo. . TECHNIQUE: Routine CT of the brain without IV contrast. CT Dose-Length Product (DLP): 906 mGy*cm CT Dose Reduction Employed: Automated exposure control(AEC) and iterative recon; COMPARISON: None available.  RESULT: Post-operative change: None. Acute change:  No evidence of a sizable/large acute territorial brain infarct/parenchymal edema.  MRI may be considered as a more sensitive modality if continued clinical concern/warranted. Hemorrhage: No evidence of acute intracranial hemorrhage. Mass Lesion / Mass Effect: There is no evidence of a sizable brain mass.   No significant mass effect or extra-axial fluid collection. Chronic (or likely chronic) changes, including brain parenchymal: Intracranial arterial wall calcifications.  Tortuous vertebrobasilar arteries with mild brainstem compressions.  Dural calcifications noted.  Punctate hypodense supratentorial white matter foci are nonspecific, but most commonly on the basis of minimal microvascular ischemia. There is mild (mostly bifrontal) cerebral volume loss.  The brain parenchyma is otherwise unremarkable for age (in this modality). Ventricles: Commensurate with sulcal sizes.  No hydrocephalus. Visualized paranasal sinuses: Partially imaged.   Few areas of minimal mucosal thickening. Other:   No depressed skull fracture is seen.  Grossly clear mastoid air cells.  The skull base shows some osteopenia, which may limit assessment for any subtle lesions.  (topogram) images: Degenerative spine changes noted.   Non-diagnostic otherwise.    IMPRESSION: Brain CT shows no evidence of an acute intracranial abnormality. Mild chronic brain parenchymal changes and other details as above. : PSCB   Transcribe Date/Time: May 25 2025 10:14P Dictated by : AMIRA MONTERO MD This examination was interpreted and the report reviewed and electronically signed by: AMIRA MONTERO MD on May 25 2025 10:20PM  EST         Lab Results  Results for orders placed or performed during the hospital encounter of 05/26/25 (from the past 24 hours)   POCT GLUCOSE   Result Value Ref Range    POCT Glucose 91 74 - 99 mg/dL   POCT GLUCOSE   Result Value Ref Range    POCT Glucose 111  (H) 74 - 99 mg/dL   CBC   Result Value Ref Range    WBC 5.3 4.4 - 11.3 x10*3/uL    nRBC 0.0 0.0 - 0.0 /100 WBCs    RBC 4.74 4.50 - 5.90 x10*6/uL    Hemoglobin 15.1 13.5 - 17.5 g/dL    Hematocrit 46.2 41.0 - 52.0 %    MCV 98 80 - 100 fL    MCH 31.9 26.0 - 34.0 pg    MCHC 32.7 32.0 - 36.0 g/dL    RDW 12.2 11.5 - 14.5 %    Platelets 192 150 - 450 x10*3/uL   Comprehensive metabolic panel   Result Value Ref Range    Glucose 97 74 - 99 mg/dL    Sodium 139 136 - 145 mmol/L    Potassium 4.2 3.5 - 5.3 mmol/L    Chloride 107 98 - 107 mmol/L    Bicarbonate 28 21 - 32 mmol/L    Anion Gap 8 (L) 10 - 20 mmol/L    Urea Nitrogen 22 6 - 23 mg/dL    Creatinine 1.05 0.50 - 1.30 mg/dL    eGFR 74 >60 mL/min/1.73m*2    Calcium 8.6 8.6 - 10.3 mg/dL    Albumin 3.7 3.4 - 5.0 g/dL    Alkaline Phosphatase 71 33 - 136 U/L    Total Protein 6.0 (L) 6.4 - 8.2 g/dL    AST 49 (H) 9 - 39 U/L    Bilirubin, Total 0.6 0.0 - 1.2 mg/dL    ALT 20 10 - 52 U/L        Medications  Scheduled medications:  Scheduled Medications[1]  Continuous medications:  Continuous Medications[2]  PRN medications:  PRN Medications[3]     Physical Exam  Constitutional:       General: He is not in acute distress.     Appearance: Normal appearance.   HENT:      Head: Normocephalic and atraumatic.      Right Ear: External ear normal.      Left Ear: External ear normal.      Nose: Nose normal.      Mouth/Throat:      Mouth: Mucous membranes are moist.      Pharynx: Oropharynx is clear.   Eyes:      Extraocular Movements: Extraocular movements intact.      Conjunctiva/sclera: Conjunctivae normal.      Pupils: Pupils are equal, round, and reactive to light.   Cardiovascular:      Rate and Rhythm: Normal rate and regular rhythm.      Pulses: Normal pulses.      Heart sounds: Normal heart sounds.   Pulmonary:      Effort: Pulmonary effort is normal. No respiratory distress.      Breath sounds: Normal breath sounds. No wheezing, rhonchi or rales.   Abdominal:      General: Bowel  sounds are normal.      Palpations: Abdomen is soft.      Tenderness: There is no abdominal tenderness. There is no right CVA tenderness, left CVA tenderness, guarding or rebound.   Musculoskeletal:         General: No swelling. Normal range of motion.      Cervical back: Normal range of motion and neck supple.   Skin:     General: Skin is warm and dry.      Capillary Refill: Capillary refill takes less than 2 seconds.      Findings: No lesion or rash.   Neurological:      General: No focal deficit present.      Mental Status: He is alert and oriented to person, place, and time. Mental status is at baseline.      Comments: +Horizontal nystagmus to the R   Psychiatric:         Mood and Affect: Mood normal.         Behavior: Behavior normal.                  Assessment/Plan        Dizziness  Vertiginous-like symptoms  -Pt states he's feeling better at this Vinod-jo pike maneuver compared to yesterday night at ED, no nystagmus noted on exam. Is able to tolerate food now.  -Continue with trial of meclizine, antiemetics PRN, Valium PRN  -MRI WO to r/o posterior stroke  -PT/OT eval  -Hold home flomax given dizziness  -Check orthostatic vitals x1- negative     HLD  -Continue home vascepa     T2DM  -Hold home oral meds for now   -Continue with SSI ACHS, Accucheks, hypoglycemic protocol      BPH  -Hold home flomax given dizziness    Code Status: Full Code                 DVT ppx: SCDs, Lovenox      Please see orders for more complete plan    Libia Roper PA-C         [1] atorvastatin, 5 mg, oral, Nightly at 0300  enoxaparin, 40 mg, subcutaneous, q24h  icosapent ethyL, 1 g, oral, BID  insulin lispro, 0-10 Units, subcutaneous, Before meals & nightly  meclizine, 25 mg, oral, q8h  pantoprazole, 40 mg, oral, Daily before breakfast   Or  pantoprazole, 40 mg, intravenous, Daily before breakfast  pneumococcal polysaccharide, 0.5 mL, intramuscular, During hospitalization  [Held by provider] tamsulosin, 0.4 mg, oral,  Daily  zolpidem, 5 mg, oral, Nightly    [2]    [3] PRN medications: acetaminophen, guaiFENesin, melatonin, ondansetron, polyethylene glycol

## 2025-05-27 NOTE — PROGRESS NOTES
Occupational Therapy  Evaluation    Patient Name: Toby Smith  MRN: [id number]  Today's Date: 5/27/2025  Time Calculation  Start Time: 1017  Stop Time: 1039  Time Calculation (min): 22 min    Current Problem: No diagnosis found.    OT order: OT eval and treat   Referred by: Edgar  Reason for referral: ADLs, safety assessment  Past medical history related to rehab: DIZZY, N/V; DX: FERTIGO, (+) ORTHOS; HX: DVT, DM, MIGRAINES,[place], OJ TKA, CERVICAL RADICULOPTHY    Precautions:   Hearing/Visual Limitations: intact  Medical Precautions: Fall precautions  Precautions Comment: + orthos. vertigo symptoms    ASSESSMENT  OT Assessment: OT eval completed. The patient is functioning below baseline for ADLs and mobility. can benefit from continued OT. Pt with Decreased ADL status, Decreased upper extremity strength, Decreased safe judgment during ADL, Decreased endurance, Decreased gross motor control, Decreased IADLs, Decreased functional mobility  Prognosis:    Barriers to discharge home: Caregiver assistance, Physical needs     Intermittent mobility assistance needed, Intermittent ADL assistance needed     Tolerance:      PLAN  Frequency: 2 times per week  Treatment Interventions: ADL retraining, Endurance training, Neuromuscular reeducation  Discharge Recommendations: Low intensity level of continued care  OT OK to discharge: Yes    GENERAL VISIT INFORMATION   Start of session communication: Bedside nurse  End of session communication: Bedside nurse  Family/caregiver present:    Caregiver feedback:    Co-Treatment: PT  Reason for co-treatment: to optimize safety and mobility, while focusing on discipline specific goals   Position Pt Received:  Bed, 3 rail up, Alarm off, not on at start of session  End of session position: Bed, 3 rail up, Alarm on    SUBJECTIVE  Home Living:  Type of Home: House  Lives With: Spouse  Home Adaptive Equipment: Walker rolling or standard  Home Layout: One level  Home Access: Stairs to enter  with rails  Entrance Stairs-Number of Steps: 3  Bathroom Shower/Tub: Walk-in shower (seat and grab bars)     Prior Level of Function:  Receives Help From: Family  ADL Assistance: Independent  Homemaking Assistance: Independent  Ambulatory Assistance: Independent  Vocational: Retired  Leisure: [employer] league for seniors. works out and weight lifts at gym multiple times a week. does yardwork  Prior Function Comments: +drives    IADL History:       Pain:  Assessment: 0-10  Score: 0 - No pain  Type:    Location:    Interventions:    Response to pain interventions:      OBJECTIVE  Vital Signs:       Cognition:  Overall Cognitive Status: Within Functional Limits  Arousal/Alertness: Appropriate responses to stimuli  Orientation Level: Oriented X4  Following Commands: Follows all commands and directions without difficulty             Current ADL function:   EATING:  Independent     GROOMING: Independent     BATHING: Minimal     UB DRESSING: Stand by     LB DRESSING: Stand by Thread LLE into pants, Thread RLE into pants, Pull up over hips   TOILETING: Minimal    ADL comments:       Activity Tolerance:  Endurance: Decreased tolerance for upright activites    Bed Mobility/Transfers:   Bed Mobility  Bed Mobility: Yes  Bed Mobility 1  Bed Mobility 1: Supine to sitting, Sitting to supine  Level of Assistance 1: Close supervision  Transfers  Transfer:  (sit<>stand SBAX1)    Ambulation/Gait Training:  Functional Mobility  Functional Mobility Performed:  (pt performed functional mobility bed to wall to bed with min A x1 hand held. pt unsteady and symptomatic with ambulation. dizziness and movements dis coordinated)    Sitting Balance:  Static Sitting Balance  Static Sitting-Level of Assistance: Close supervision  Dynamic Sitting Balance  Dynamic Sitting-Level of Assistance: Close supervision    Standing Balance:  Static Standing Balance  Static Standing-Level of Assistance: Contact guard, Minimum assistance  Dynamic Standing  Balance  Dynamic Standing-Level of Assistance: Contact guard, Minimum assistance    Vision: Vision - Basic Assessment  Current Vision: No visual deficits   and Vision - Complex Assessment  Vision Comments: + nystagmus. worse on R eye than L eye    Sensation:  Light Touch: Partial deficits in the LUE (neuropathy small finger and half ring finger)    Strength:  Strength Comments: BUE WFL.    Perception:  Inattention/Neglect: Appears intact    Coordination:  Movements are Fluid and Coordinated: Yes     Hand Function:  Hand Function  Gross Grasp: Functional    Extremities: RUE   RUE : Within Functional Limits and LUE   LUE: Within Functional Limits    Outcome Measures: [facility] Daily Activity   Putting on and taking off regular lower body clothing: A little  Bathing (including washing, rinsing, drying): A lot  Putting on and taking off regular upper body clothing: A little  Toileting, which includes using toilet, bedpan or urinal: A little  Taking care of personal grooming such as brushing teeth: None   Eating Meals: None   Daily Activity - Total Score: 19    EDUCATION:     Education Documentation  ADL Training, taught by Cleo Marks OT at 5/27/2025  1:17 PM.  Learner: Patient  Readiness: Acceptance  Method: Explanation  Response: Needs Reinforcement    Education Comments  No comments found.        Goals:   Encounter Problems       Encounter Problems (Active)       BALANCE       Patient will tolerate standing for 10 minutes to modified independent level of assistance with least restrictive device in order to improve functional activity tolerance for ADL tasks without adverse symptoms. (Progressing)       Start:  05/27/25    Expected End:  06/10/25

## 2025-05-27 NOTE — SIGNIFICANT EVENT
Patient signed out to me as having come in with vertiginous symptoms with Hallpike maneuver currently compared to yesterday.  There is no nystagmus on exam per examining provider earlier today.  Patient appears to be feeling well.  Progress Notes and H&P have been reviewed.  Event    Plan is to discharge patient with currently written plan.  MRI of the brain shows no significant masses or evidence of infarction.    Will add order for discharge home to complete discharge orders been written previously.    Okay to discharge home  See pending discharge summary for more specific details on patient's hospitalization.    Diagnosis-vertigo noncentral

## 2025-05-27 NOTE — PROGRESS NOTES
05/27/25 1515   Discharge Planning   Living Arrangements Spouse/significant other   Support Systems Spouse/significant other   Assistance Needed none   Type of Residence Private residence   Home or Post Acute Services None   Expected Discharge Disposition Home   Does the patient need discharge transport arranged? No   Stroke Family Assessment   Stroke Family Assessment Needed No   Intensity of Service   Intensity of Service 0-30 min     Discharge planning assessment unable to be completed with patient as he is off the floor for MRI. I spoke with his wife, who is waiting at the bedside. Patient is independent with mobility and self care needs. He follows with PCP Db Sarmiento. Patient drives himself to appointments and errands; spouse states no difficulties with affording rx medications. Patient is recommended for LOW intensity therapies at discharge, which spouse says patient would not want. TCC following, will discuss outpatient PT with patient once he is available.

## 2025-05-27 NOTE — NURSING NOTE
Pt reports dizziness still present, but improved from yesterday. Pt underwent MRI, results pending at this time. Pt is anxious to be discharged and stated he will want to sign himself out following results of MRI. Provider notified. Currently awaiting results of MRI which will be reported to the provider for discharge decision. Pt denies any further needs at this time. Call light within reach and bed in low position.

## 2025-05-27 NOTE — CARE PLAN
Problem: Pain - Adult  Goal: Verbalizes/displays adequate comfort level or baseline comfort level  Outcome: Progressing  Flowsheets (Taken 5/26/2025 1742 by Randy Linares RN)  Verbalizes/displays adequate comfort level or baseline comfort level: Encourage patient to monitor pain and request assistance     Problem: Fall/Injury  Goal: Not fall by end of shift  Outcome: Progressing  Goal: Be free from injury by end of the shift  Outcome: Progressing  Goal: Verbalize understanding of personal risk factors for fall in the hospital  Outcome: Progressing  Goal: Verbalize understanding of risk factor reduction measures to prevent injury from fall in the home  Outcome: Progressing  Goal: Use assistive devices by end of the shift  Outcome: Progressing  Goal: Pace activities to prevent fatigue by end of the shift  Outcome: Progressing   The patient's goals for the shift include      The clinical goals for the shift include pt will have decreased dizziness throughout shift

## 2025-05-27 NOTE — CARE PLAN
Problem: Mobility  Goal: STG - Patient will ambulate  Description: 100+ FT W/ FWW PRN FOR STABILITY SBA  Outcome: Not Progressing  Goal: STG - Patient will ascend and descend four to six stairs  Description: EDE CGA  Outcome: Not Progressing  Goal: Goal 1  Description: ASSISTED VESTIBULAR HABITUATION ACTIVITIES/EX  Outcome: Not Progressing

## 2025-05-27 NOTE — PROGRESS NOTES
Toby Smith is a 75 y.o. male admitted for Vertigo. Pharmacy reviewed the patient's [id number] medications and allergies for accuracy.    The list below reflects the PTA list prior to pharmacy medication history. A summary a changes to the PTA medication list has been listed below. Please review each medication in order reconciliation for additional clarification and justification.    Source of information:  [facility]  [facility]    Medications added:  OMEPRAZOLE 20MG 0.5 TAB every day  VITAMIN D3 1 every day  GLUCOSAMINE/CHOND 1250MG 1 every day  GARLIC TABS 100MG 1 BID  LYCOPENE 1 every day  B COMPLEX VITAMIN CAPSULE    Medications modified:    Medications to be removed:  OMEPRAZOLE 40MG    Medications of concern:      Prior to Admission Medications   Prescriptions Last Dose Informant Patient Reported? Taking?   SUMAtriptan (Imitrex) 100 mg tablet   Yes No   Sig: Take 1 tablet (100 mg) by mouth 1 time if needed for migraine.   ascorbic acid (Vitamin C) 1,000 mg tablet   Yes No   Sig: Take 1 tablet (1,000 mg) by mouth once daily.   atorvastatin (Lipitor) 10 mg tablet   Yes No   Sig: Take 0.5 tablets (5 mg) by mouth at 3:00 in the morning.   calcium carbonate-vitamin D3 250 mg-3.125 mcg (125 unit) tablet   Yes No   Sig: Take 1 tablet by mouth 2 times daily (morning and late afternoon).   meloxicam (Mobic) 15 mg tablet   Yes No   Sig: Take 1 tablet (15 mg) by mouth once daily.   metFORMIN (Glucophage) 1,000 mg tablet   Yes No   Sig: Take 1 tablet (1,000 mg) by mouth 2 times daily (morning and late afternoon).   multivitamin tablet   Yes No   Sig: Take 1 tablet by mouth once daily.   omega-3 acid ethyl esters (Lovaza) 1 gram capsule   Yes No   Sig: Take 1 capsule (1 g) by mouth 2 times a day.   omeprazole (PriLOSEC) 40 mg DR capsule   Yes No   Sig: Take 1 capsule (40 mg) by mouth. Do not crush or chew.   tamsulosin (Flomax) 0.4 mg 24 hr capsule   Yes No   Sig: Take 1 capsule (0.4 mg) by mouth at 3:00 in the  morning. Do not crush, chew, or split.   testosterone (Androgel) 1 % (25 mg/2.5gram) gel in packet   Yes No   Sig: Place 20.25 mg on the skin once daily.   valACYclovir (Valtrex) 1 gram tablet   Yes No   Sig: Take 1 tablet (1,000 mg) by mouth 2 times a day as needed.   zolpidem CR (Ambien CR) 6.25 mg ER tablet   Yes No   Sig: Take 2 tablets (12.5 mg) by mouth as needed at bedtime for sleep. Do not crush, chew, or split.      Facility-Administered Medications: None       Edith Rucker

## 2025-05-27 NOTE — CARE PLAN
The patient's goals for the shift include      The clinical goals for the shift include Pt will remain safe and not fall during shift    Over the shift, the patient did not make progress toward the following goals. Barriers to progression include . Recommendations to address these barriers include   Problem: Pain - Adult  Goal: Verbalizes/displays adequate comfort level or baseline comfort level  Outcome: Progressing     Problem: Fall/Injury  Goal: Not fall by end of shift  Outcome: Progressing  Goal: Be free from injury by end of the shift  Outcome: Progressing  Goal: Verbalize understanding of personal risk factors for fall in the hospital  Outcome: Progressing  Goal: Verbalize understanding of risk factor reduction measures to prevent injury from fall in the home  Outcome: Progressing  Goal: Use assistive devices by end of the shift  Outcome: Progressing  Goal: Pace activities to prevent fatigue by end of the shift  Outcome: Progressing   .

## 2025-05-27 NOTE — PROGRESS NOTES
Physical Therapy    Physical Therapy Evaluation    Patient Name: Toby Smith  MRN: [id number]  Department: [facility] 2 E OBS  Room: 231/2311-B  Today's Date: 5/27/2025   Time Calculation  Start Time: 1016  Stop Time: 1041  Time Calculation (min): 25 min    Assessment/Plan   PT Assessment  PT Assessment Results: Decreased endurance, Impaired balance, Decreased mobility (VERTIGO)  Rehab Prognosis: Good  Barriers to Discharge Home: Physical needs  Physical Needs: Stair navigation into home limited by function/safety, Ambulating household distances limited by function/safety, 24hr mobility assistance needed, Intermittent ADL assistance needed, High falls risk due to function or environment  Evaluation/Treatment Tolerance: Patient limited by fatigue (VERTIGO)  End of Session Communication: Bedside nurse  Assessment Comment: MIN A TO AMB 2/2 VERTIGO, NOTED MILD>MOD NYSTAGMUS TO R OJ EYES, IF MRI (-) WILL DO MARCY HALLPIKE , RECOMMEND FOLLOWUP W/ VESTIBULAR ENT & P.T., LOW REHAB  End of Session Patient Position: Bed, 3 rail up, Alarm on (CALL LIGHTIN REACH)  IP OR SWING BED PT PLAN  Inpatient or Swing Bed: Inpatient  PT Plan  Treatment/Interventions: Transfer training, Gait training, Stair training, Endurance training, Therapeutic activity, Therapeutic exercise (VESTIBULAR HABITUATION ACTIVITIES)  PT Plan: Ongoing PT  PT Frequency: 5 times per week  PT Discharge Recommendations: Low intensity level of continued care (VESTIBUALR ENT & P.T.)  Equipment Recommended upon Discharge:  (TBD)  PT Recommended Transfer Status: Assist x1 (FWW PRN)  PT - OK to Discharge: Yes (WHEN MEDICALLY CLEARED)    Subjective     PT Visit Info:  PT Received On: 05/27/25  General Visit Information:  General  Reason for Referral: IMPAIRED MOBILITY GAIT TRAINING  Referred By: FELICIA  Past Medical History Relevant to Rehab: DIZZY, N/V; DX: VERTIGO, (+) ORTHOS; HX: DVT, DM, MIGRAINES,[place], OJ TKA, CERVICAL RADICULOPTHY  Co-Treatment: OT  Co-Treatment  Reason: FACILITATE MOBILITY SAFETY  Prior to Session Communication: Bedside nurse (OK FOR THERAPY)  Patient Position Received: Bed, 3 rail up, Alarm off, not on at start of session (ROOM 2311 ALERT IV, AGREES TO THERAPY)  General Comment: REPORTS INTERMITTANT DIZZINESS LAST WEEK, MATTHEW W/ POSITION CHANGES, GOT WORSE & HAS N/V SO CAMEINTO ED, REPORTS TENDERNESS IN OCCIPITAL MUSCULAR W/ THE DIZZINESS LAST WEEK- NONE NOW  Home Living:  Home Living  Home Living Comments: SPOUSE 1 LEVEL HOEM, 3 ISABELLE, INDEP AMB/ADL, WALK IN SHOWER/BARS/SEAT, HAS CANE & WALKER, GOES TO [employer] & PLAYS IN BASEBALL LEAGUE, DRIVES, DOES CHORES  Prior Level of Function:     Precautions:  Precautions  Medical Precautions: Fall precautions (VERTIGO)             Objective   Pain:  Pain Assessment  0-10 (Numeric) Pain Score: 0 - No pain  Cognition:  Cognition  Overall Cognitive Status: Within Functional Limits  Arousal/Alertness: Appropriate responses to stimuli  Orientation Level: Oriented X4  Following Commands: Follows all commands and directions without difficulty  Safety Judgment: Good awareness of safety precautions  Problem Solving: Assistance required to generate solutions  Insight: Within function limits    General Assessments:  General Observation  General Observation: (+) NYSTAGMUS TO R W/ ACTIVITY(TRANSITIONS) R>L               Activity Tolerance  Endurance: Decreased tolerance for upright activites    Sensation  Sensation Comment: MILD UE TINGLING    Strength  Strength Comments: ROM & STRENGTH IN LEGS WFL  Strength  Strength Comments: ROM & STRENGTH IN LEGS WFL                     Static Sitting Balance  Static Sitting-Comment/Number of Minutes: GOOD  Dynamic Sitting Balance  Dynamic Sitting-Comments: FAIR    Static Standing Balance  Static Standing-Comment/Number of Minutes: FAIR/FAIR-  Dynamic Standing Balance  Dynamic Standing-Comments: FAIR/FAIR-  Functional Assessments:  ADL  ADL's Addressed:  (SBA TO DON PANTS WHILE SEATED EOB)    Bed  Mobility  Bed Mobility:  (SIT<>SUPINE SBA, SITS EOB SBA)    Transfers  Transfer:  (SIT<>STAND CGA, WIDER LULY)    Ambulation/Gait Training  Ambulation/Gait Training Performed:  (AMB IN ROOM 10 FT MIN A UNSTEADY W/ AMB, C/O DIZZINESS)  Extremity/Trunk Assessments:  Cervical Spine   Cervical Spine: Within Functional Limits (NOTED MILD TIGHTNESS IN CERVICAL MUSCULATURE)  Outcome Measures:  [facility] Basic Mobility  Turning from your back to your side while in a flat bed without using bedrails: None  Moving from lying on your back to sitting on the side of a flat bed without using bedrails: A little  Moving to and from bed to chair (including a wheelchair): A little  Standing up from a chair using your arms (e.g. wheelchair or bedside chair): A little  To walk in hospital room: A little  Climbing 3-5 steps with railing: A lot  Basic Mobility - Total Score: 18    Encounter Problems       Encounter Problems (Active)       Mobility       STG - Patient will ambulate (Not Progressing)       Start:  05/27/25    Expected End:  05/31/25       100+ FT W/ FWW PRN FOR STABILITY SBA         STG - Patient will ascend and descend four to six stairs (Not Progressing)       Start:  05/27/25    Expected End:  05/31/25       RAILS CGA         Goal 1 (Not Progressing)       Start:  05/27/25    Expected End:  06/06/25       ASSISTED VESTIBULAR HABITUATION ACTIVITIES/EX                Education Documentation  Mobility Training, taught by Juliana Manuel, PT at 5/27/2025 12:59 PM.  Learner: Patient  Readiness: Acceptance  Method: Explanation  Response: Verbalizes Understanding, Needs Reinforcement  Comment: MOBILTIY SAFETY,NEED FOR FOLLOW UP ON DISCH    Education Comments  No comments found.

## 2025-05-29 NOTE — DISCHARGE SUMMARY
Admission Date: 5/26/2025  4:38 PM  Discharge Date: 5/27/2025   Condition at discharge: Fair    Discharge Diagnosis  Dizziness, Vertiginous-like symptoms, non-central  HLD  DM-II  BPH  Code Status: Full Code              Test Results Pending At Discharge  Pending Labs       No current pending labs.            Hospital Course   Toby Smith is a 75 y.o. male with PMHx s/f HLD, history of DVT, T2DM, BPH, GERD presented 5/26/25 with dizziness. He states the dizziness started on Wednesday, had half a day of dizziness/nausea then went away. Then, he was back to normal until 4am Sunday. He woke up from dizziness with his room spinning sensation, he couldn't focus on his alarm clock and lamp as it looked like a motion blur. He has a hard time ambulating, but is able to stand up. Dizziness is exacerbated by quick movements of his head. Has nausea/vomiting prior to [place] [facility] and wasn't able to tolerate food for about 12 hrs. He has a history of migraines, used to take sumatriptan but hasn't had it for a couple years. He was transferred from [facility] ED as patient is having trouble ambulating and for further workup. Denies vision changes, facial droop, speech changes, hearing changes, focal or lateralizing weakness. BMP, CBC largely unremarkable. CT head-no acute intracranial abnormality.  Mild chronic brain parenchymal changes. Patient states he is feeling better after [place] maneuver compared to when he was in the ED, no nystagmus noted on exam. Is able to tolerate food now.     5/27/2025: No acute events overnight. Vitals stable. CBC/BMP reviewed, largely unremarkable.    Trial scheduled meclizine with PRN valium. Stop flomax. Check MRI brain   Patient reports overall he feels significantly better. Still with dizziness, more-so with movements of the head but does improve with lying still, reports it does not completely resolve.   +Horizontal nystagmus      MRI reviewed by Dr. Solomon (please see note)  shows no significant masses or evidence of infarction. Patient discharged to home, per his request, stating he would sign out AMA if necessary. Follow up with PCP. Short course meclizine    Consultations: N/a    Pertinent Physical Exam At Time of Discharge  Constitutional:       General: He is not in acute distress.     Appearance: Normal appearance.   HENT:      Head: Normocephalic and atraumatic.      Right Ear: External ear normal.      Left Ear: External ear normal.      Nose: Nose normal.      Mouth/Throat:      Mouth: Mucous membranes are moist.      Pharynx: Oropharynx is clear.   Eyes:      Extraocular Movements: Extraocular movements intact.      Conjunctiva/sclera: Conjunctivae normal.      Pupils: Pupils are equal, round, and reactive to light.   Cardiovascular:      Rate and Rhythm: Normal rate and regular rhythm.      Pulses: Normal pulses.      Heart sounds: Normal heart sounds.   Pulmonary:      Effort: Pulmonary effort is normal. No respiratory distress.      Breath sounds: Normal breath sounds. No wheezing, rhonchi or rales.   Abdominal:      General: Bowel sounds are normal.      Palpations: Abdomen is soft.      Tenderness: There is no abdominal tenderness. There is no right CVA tenderness, left CVA tenderness, guarding or rebound.   Musculoskeletal:         General: No swelling. Normal range of motion.      Cervical back: Normal range of motion and neck supple.   Skin:     General: Skin is warm and dry.      Capillary Refill: Capillary refill takes less than 2 seconds.      Findings: No lesion or rash.   Neurological:      General: No focal deficit present.      Mental Status: He is alert and oriented to person, place, and time. Mental status is at baseline.      Comments: +Horizontal nystagmus to the R   Psychiatric:         Mood and Affect: Mood normal.         Behavior: Behavior normal.     Home Medications     Medication List      START taking these medications     meclizine 25 mg tablet;  Commonly known as: Antivert; Take 1 tablet (25   mg) by mouth every 8 hours. For 3 days then take every 8 hours as needed   for dizziness     CONTINUE taking these medications     ascorbic acid 1,000 mg tablet; Commonly known as: Vitamin C   atorvastatin 10 mg tablet; Commonly known as: Lipitor   b complex vitamins capsule   calcium carbonate-vitamin D3 250 mg-3.125 mcg (125 unit) tablet   garlic 100 mg tablet   LYCOPENE ORAL   meloxicam 15 mg tablet; Commonly known as: Mobic   metFORMIN 1,000 mg tablet; Commonly known as: Glucophage   multivitamin tablet   omega-3 acid ethyl esters 1 gram capsule; Commonly known as: Lovaza   omeprazole OTC 20 mg EC tablet; Commonly known as: PriLOSEC OTC   SUMAtriptan 100 mg tablet; Commonly known as: Imitrex   testosterone 1 % (25 mg/2.5gram) gel in packet; Commonly known as:   Androgel   UNABLE TO FIND   valACYclovir 1 gram tablet; Commonly known as: Valtrex   Vitamin D3 25 mcg (1,000 units) tablet; Generic drug: cholecalciferol   zolpidem CR 6.25 mg ER tablet; Commonly known as: Ambien CR     STOP taking these medications     Flomax 0.4 mg 24 hr capsule; Generic drug: tamsulosin       Outpatient Follow-Up  No future appointments.      At the time of discharge, patient's pain was controlled with oral analgesia, patient was urinating, having BMs, sleeping, and eating well. Follow up recommendations are in discharge paperwork. Discharge plan was discussed with the patient/family and all of the questions were answered. Medications were ordered to be delivered to bedside prior to discharge.     Discharge planning took greater than 35 minutes    Diagnoses at time of discharge:  Dizziness, Vertiginous-like symptoms, non-central  HLD  DM-II  BPH  Code Status: Full Code              Anticipated discharge destination: home    Please see orders for more complete plan    Libia Roper PA-C

## 2025-06-04 ENCOUNTER — CLINICAL SUPPORT (OUTPATIENT)
Dept: PHYSICAL THERAPY | Facility: CLINIC | Age: 75
End: 2025-06-04
Payer: MEDICARE

## 2025-06-04 DIAGNOSIS — R42 VERTIGO: ICD-10-CM

## 2025-06-04 PROCEDURE — 97162 PT EVAL MOD COMPLEX 30 MIN: CPT | Mod: GP

## 2025-06-04 PROCEDURE — 97535 SELF CARE MNGMENT TRAINING: CPT | Mod: GP

## 2025-06-04 ASSESSMENT — ENCOUNTER SYMPTOMS
DEPRESSION: 0
OCCASIONAL FEELINGS OF UNSTEADINESS: 0
LOSS OF SENSATION IN FEET: 0

## 2025-06-04 NOTE — PROGRESS NOTES
Physical Therapy Evaluation    Subjective:  Patient Name: Toby Smith  MRN: [id number]  Today's Date: 6/4/2025  Visit: 1  Referred by: Libia Stevens  Time Calculation  Start Time: 1115  Stop Time: 1200  Time Calculation (min): 45 min  Diagnosis: vertigo  Onset: 5/21/25  HPI:  Patient had dizziness 5/21 that resolved in 24 hours.  Woke up 3 days later, with dizziness, nausea, vomiting.  Went to ER and admitted to hospital.  Still has mild dizziness/light headedness.  No longer nauseous.  Still having imbalance.  Increased sx turning to the R, narrow LULY, head turns L    Current Medical Management:  -went to [facility] for PT consult.  Doing tandem walking, x1 viewing, foam with head turns, feet apart with EC for HEP  Precautions: none  Functional Limitations: imbalance with quick turns  Prior Level of Function: plays softball, indep with IADLs  Work Status: NA  Living Environment: no issues  Social Support: wife  Personal Factors that may impact care: none  Does have h/o migraines, but hasn't had one in yrs due to cutting out MSG.  Migraines were typically L sided over head and eye.  Does still get visual auras.    Objective:    Gait: unremarkable    Cervical ROM:  Flexion= WNL  Extension= WNL  Sidebend= WNL  Rotation= mild dizziness    VOMS:  Smooth Pursuits= mild saccades  Saccades= WNL  VOR horizontal= WNL  VOR vertical= WNL    Positional Testing:  Vinod Hallpke:  R=down beating (geotropic) nystagmus with long duration, no c/o dizziness  L= negative  Roll Test:  R= negative  L= negative    Treatment Performed Today:  Epley for possible R posterior canal BPPV  Reviewed previous HEP and suggested adding UT stretch  Educated on possible dx/causes of sx.  Assessment:  76 yo M with improving dizziness since initial onset 5/21.  Sx appear related to vestibular dysfunction (possible neuritis vs. Vestibular migraine vs meniere's).  He is currently being treated at [facility] PT, but arrived today for second opinion.   Recommend continued vestibular PT at [facility], but would benefit from ENT consult if sx do not fully resolve or if has repeated episodes of dizziness.     . Moderate complexity due to patient's clinical presentation being evolving with changing characteristics, with comorbidities/complexities to include h/o migraine, neck pain, all of which may negatively impact rehab tolerance and progression.    Clinical presentation:  Evolving with changing characteristics  Problem List:  Dizziness, imbalance  Level of Complexity:  mod  Plan:  -Goals:  No goals needed at this time.  Will make goals if patient needs to return.  -Frequency & Duration:  Will keep chart open for 1-2 months should patient need to return.  Otherwise, patient will continue PT at her prior location ([facility])  -Rehab Potential:  good  Plan of care was developed with input and agreement of the patient.    Billing:  PT Evaluation Time Entry  PT Evaluation (Moderate) Time Entry: 35  PT Therapeutic Procedures Time Entry  Self-Care/Home Mgmt Training: 10

## 2025-06-11 ENCOUNTER — APPOINTMENT (OUTPATIENT)
Dept: PHYSICAL THERAPY | Facility: CLINIC | Age: 75
End: 2025-06-11
Payer: MEDICARE

## 2025-06-30 ENCOUNTER — APPOINTMENT (OUTPATIENT)
Dept: PHYSICAL THERAPY | Facility: CLINIC | Age: 75
End: 2025-06-30
Payer: MEDICARE

## 2025-07-07 ENCOUNTER — APPOINTMENT (OUTPATIENT)
Dept: PHYSICAL THERAPY | Facility: CLINIC | Age: 75
End: 2025-07-07
Payer: MEDICARE